# Patient Record
Sex: FEMALE | Race: WHITE | Employment: OTHER | URBAN - METROPOLITAN AREA
[De-identification: names, ages, dates, MRNs, and addresses within clinical notes are randomized per-mention and may not be internally consistent; named-entity substitution may affect disease eponyms.]

---

## 2021-02-02 ENCOUNTER — IMMUNIZATION (OUTPATIENT)
Dept: PRIMARY CARE CLINIC | Age: 81
End: 2021-02-02
Payer: MEDICARE

## 2021-02-02 DIAGNOSIS — Z23 NEED FOR VACCINATION: Primary | ICD-10-CM

## 2021-02-02 PROCEDURE — 91300 COVID-19, PFIZER VACCINE 30MCG/0.3ML DOSE: CPT | Performed by: CLINICAL NURSE SPECIALIST

## 2021-02-02 PROCEDURE — 0001A COVID-19, PFIZER VACCINE 30MCG/0.3ML DOSE: CPT | Performed by: CLINICAL NURSE SPECIALIST

## 2021-02-23 ENCOUNTER — IMMUNIZATION (OUTPATIENT)
Dept: PRIMARY CARE CLINIC | Age: 81
End: 2021-02-23
Payer: MEDICARE

## 2021-02-23 PROCEDURE — 0002A COVID-19, PFIZER VACCINE 30MCG/0.3ML DOSE: CPT | Performed by: CLINICAL NURSE SPECIALIST

## 2021-02-23 PROCEDURE — 91300 COVID-19, PFIZER VACCINE 30MCG/0.3ML DOSE: CPT | Performed by: CLINICAL NURSE SPECIALIST

## 2021-10-12 ENCOUNTER — IMMUNIZATION (OUTPATIENT)
Dept: PRIMARY CARE CLINIC | Age: 81
End: 2021-10-12
Payer: MEDICARE

## 2021-10-12 PROCEDURE — 91300 COVID-19, PFIZER VACCINE 30MCG/0.3ML DOSE: CPT | Performed by: PHYSICIAN ASSISTANT

## 2021-10-12 PROCEDURE — 0003A COVID-19, PFIZER VACCINE 30MCG/0.3ML DOSE: CPT | Performed by: PHYSICIAN ASSISTANT

## 2022-08-06 ENCOUNTER — HOSPITAL ENCOUNTER (EMERGENCY)
Age: 82
Discharge: HOME | End: 2022-08-06
Attending: EMERGENCY MEDICINE
Payer: MEDICARE

## 2022-08-06 VITALS
DIASTOLIC BLOOD PRESSURE: 96 MMHG | TEMPERATURE: 99.2 F | SYSTOLIC BLOOD PRESSURE: 142 MMHG | WEIGHT: 160 LBS | RESPIRATION RATE: 18 BRPM | HEART RATE: 85 BPM | HEIGHT: 65 IN | OXYGEN SATURATION: 96 % | BODY MASS INDEX: 26.66 KG/M2

## 2022-08-06 DIAGNOSIS — L03.211 FACIAL CELLULITIS: Primary | ICD-10-CM

## 2022-08-06 DIAGNOSIS — K04.7 DENTAL INFECTION: ICD-10-CM

## 2022-08-06 LAB — SARS-COV-2, NAAT: NOT DETECTED

## 2022-08-06 PROCEDURE — 6370000000 HC RX 637 (ALT 250 FOR IP): Performed by: STUDENT IN AN ORGANIZED HEALTH CARE EDUCATION/TRAINING PROGRAM

## 2022-08-06 PROCEDURE — 87635 SARS-COV-2 COVID-19 AMP PRB: CPT

## 2022-08-06 PROCEDURE — 99283 EMERGENCY DEPT VISIT LOW MDM: CPT

## 2022-08-06 RX ORDER — OMEPRAZOLE 20 MG/1
20 CAPSULE, DELAYED RELEASE ORAL DAILY
COMMUNITY

## 2022-08-06 RX ORDER — TRAMADOL HYDROCHLORIDE 50 MG/1
50 TABLET ORAL EVERY 8 HOURS PRN
COMMUNITY

## 2022-08-06 RX ORDER — LOSARTAN POTASSIUM 100 MG/1
100 TABLET ORAL DAILY
COMMUNITY

## 2022-08-06 RX ORDER — AMOXICILLIN AND CLAVULANATE POTASSIUM 875; 125 MG/1; MG/1
1 TABLET, FILM COATED ORAL ONCE
Status: COMPLETED | OUTPATIENT
Start: 2022-08-06 | End: 2022-08-06

## 2022-08-06 RX ORDER — VENLAFAXINE HYDROCHLORIDE 37.5 MG/1
37.5 CAPSULE, EXTENDED RELEASE ORAL DAILY
COMMUNITY

## 2022-08-06 RX ORDER — AMLODIPINE BESYLATE 10 MG/1
10 TABLET ORAL DAILY
COMMUNITY

## 2022-08-06 RX ORDER — AMOXICILLIN AND CLAVULANATE POTASSIUM 875; 125 MG/1; MG/1
1 TABLET, FILM COATED ORAL 2 TIMES DAILY
Qty: 20 TABLET | Refills: 0 | Status: SHIPPED | OUTPATIENT
Start: 2022-08-06 | End: 2022-08-16

## 2022-08-06 RX ORDER — VENLAFAXINE 75 MG/1
75 TABLET ORAL NIGHTLY
COMMUNITY

## 2022-08-06 RX ADMIN — AMOXICILLIN AND CLAVULANATE POTASSIUM 1 TABLET: 875; 125 TABLET, FILM COATED ORAL at 22:46

## 2022-08-06 ASSESSMENT — LIFESTYLE VARIABLES
HOW OFTEN DO YOU HAVE A DRINK CONTAINING ALCOHOL: NEVER
HOW OFTEN DO YOU HAVE A DRINK CONTAINING ALCOHOL: NEVER

## 2022-08-06 ASSESSMENT — PAIN DESCRIPTION - LOCATION
LOCATION: MOUTH
LOCATION: MOUTH

## 2022-08-06 ASSESSMENT — PAIN DESCRIPTION - FREQUENCY
FREQUENCY: CONTINUOUS
FREQUENCY: CONTINUOUS

## 2022-08-06 ASSESSMENT — PAIN DESCRIPTION - ORIENTATION
ORIENTATION: LEFT
ORIENTATION: LEFT

## 2022-08-06 ASSESSMENT — PAIN - FUNCTIONAL ASSESSMENT
PAIN_FUNCTIONAL_ASSESSMENT: NONE - DENIES PAIN
PAIN_FUNCTIONAL_ASSESSMENT: 0-10
PAIN_FUNCTIONAL_ASSESSMENT: 0-10

## 2022-08-06 ASSESSMENT — PAIN DESCRIPTION - PAIN TYPE
TYPE: ACUTE PAIN
TYPE: ACUTE PAIN

## 2022-08-06 ASSESSMENT — PAIN SCALES - GENERAL: PAINLEVEL_OUTOF10: 8

## 2022-08-07 NOTE — ED PROVIDER NOTES
[address]   ED Provider Note  Department of Emergency Medicine     ED Room:       Written by: Carlos Moore DO  Patient Name: Lavinia Cooney  Attending Provider: Maulik Price DO  Admit Date: 2022  8:21 PM  MRN: [id number]    : 1940        Chief Complaint   Patient presents with    Fever     Pt has no c/o    Dental Pain     left sided facial redness/ swelling    - Chief complaint    HPI   Lavinia Cooney is a [de-identified] y.o. female presenting to the ED for evaluation of Fever (Pt has no c/o) and Dental Pain (left sided facial redness/ swelling)      History obtained from patient. Patient is presenting for evaluation of left-sided dental pain on the lower aspect as well as redness and some swelling to her left cheek. States she noticed the symptoms starting to develop yesterday, states that she did have a fever earlier today T-max 102 °F. She did not take any antipyretics. Patient's complaints are mild in severity; fever is improved, the facial pain/swelling has been about the same since she noticed it started developing. She does take tramadol at home occasionally for pain, that seem to improve her symptoms. Nothing in particular makes it worse. She denies any headache, neck pain or stiffness, change in vision, difficulty speaking or swallowing, open wounds or drainage noted anywhere. Review of Systems   Constitutional:  Positive for fever. Negative for chills. HENT:  Positive for dental problem (left lower tooth pain) and facial swelling (left lower cheek, with redness and warmth). Negative for rhinorrhea, sore throat, trouble swallowing and voice change. Eyes:  Negative for visual disturbance. Respiratory:  Negative for cough and shortness of breath. Cardiovascular:  Negative for chest pain and palpitations. Gastrointestinal:  Negative for abdominal pain, diarrhea, nausea and vomiting. Genitourinary:  Negative for dysuria and frequency. warm and dry. Capillary Refill: Capillary refill takes less than 2 seconds. Neurological:      General: No focal deficit present. Mental Status: She is alert and oriented to person, place, and time. Sensory: No sensory deficit. Psychiatric:         Mood and Affect: Mood normal.         Behavior: Behavior normal.        Procedures       MDM              Medical Decision Making: This is a [de-identified] y.o. female presenting for evaluation of left facial redness/warmth/swelling/tenderness starting today. Also had a fever. She is alert and oriented on arrival and in no acute distress; she is nontoxic in appearance. No meningeal signs. On examination there is mild redness and warmth to outside left lower/mid cheek region; minimal TTP her; no significant induration or fluctuance. Abnormal dentition noted to left lower and upper teeth; no obvious dental abscess seen on exam. No evidence to suggest Evan's angina. Normal phonation. Handling secretions. Oropharynx clear. No open wounds or drainage noted anywhere. Patient was tested for COVID-19 and it was negative. I do suspect a mild facial cellulitis on the left, possibly related to the abnormal dentition noted here especially given the patient was reporting dental pain. We we will start her on a course of Augmentin and directed her to follow-up outpatient with both her PCP and the [address] [facility] in [facility]. I made her an appointment online and provided her with the information on how to contact their office first thing Monday morning. Patient is amenable to this plan. She declined any need for analgesic medication during this encounter. She remained afebrile during this encounter. Feel she is stable and appropriate for discharge. Results and plan were discussed with the patient and her , they voiced understanding and are amenable. Strict return precautions were discussed. See ED COURSE for additional MDM. Labs & imaging were reviewed and interpreted, see RESULTS. I have personally reviewed all laboratory and imaging results for this patient. I have discussed this patient with my attending, who has seen the patient and agrees with this disposition. Patient was seen and evaluated by myself and my attending Husam Navarrete DO. Assessment and Plan discussed with attending provider, please see attestation for final plan of care.         --------------------------------------------- PAST HISTORY ---------------------------------------------  Past Medical History:  has no past medical history on file. Past Surgical History:  has no past surgical history on file. Social History:  reports that she has never smoked. She has never been exposed to tobacco smoke. She has never used smokeless tobacco. She reports that she does not drink alcohol and does not use drugs. Family History: family history is not on file. Unless otherwise noted, family history is non contributory. The patients home medications have been reviewed. Allergies: Patient has no known allergies. -------------------------------------------------- RESULTS -------------------------------------------------  Labs:  Results for orders placed or performed during the hospital encounter of 08/06/22   COVID-19, Rapid    Specimen: Nasopharyngeal Swab   Result Value Ref Range    SARS-CoV-2, NAAT Not Detected Not Detected       Radiology:  No orders to display       Interpreted by the radiologist unless otherwise specified.      ------------------------- NURSING NOTES AND VITALS REVIEWED ---------------------------  Date / Time Roomed:  8/6/2022  8:21 PM  ED Bed Assignment:  05/05    The nursing notes within the ED encounter and vital signs as below have been reviewed by myself.    BP (!) 142/96   Pulse 85   Temp 99.2 °F (37.3 °C)   Resp 18   Ht 5' 5\" (1.651 m)   Wt 160 lb (72.6 kg)   SpO2 96%   BMI 26.63 kg/m²   Oxygen Saturation Lior Todd DO  Resident  08/08/22 7858      ATTENDING PROVIDER ATTESTATION:     I have personally performed and/or participated in the history, exam, medical decision making, and procedures and agree with all pertinent clinical information. I have also reviewed and agree with the past medical, family and social history unless otherwise noted. I have discussed this patient in detail with the resident, and provided the instruction and education regarding fever, dental pain, facial swelling and abscess. My findings/Plan: Tachycardic. Lungs CTA bilaterally. Abdomen soft, nontender. Bowel sounds normal. Dental caries present without evidence of abscess. Left facial swelling noted. Supportive care. Antibiotics. Discharge for outpatient follow up with dentistry.        Antione Khanna DO  09/08/22 2201

## 2022-08-08 ASSESSMENT — ENCOUNTER SYMPTOMS
RHINORRHEA: 0
ABDOMINAL PAIN: 0
VOMITING: 0
COUGH: 0
NAUSEA: 0
SHORTNESS OF BREATH: 0
SORE THROAT: 0
DIARRHEA: 0
VOICE CHANGE: 0
TROUBLE SWALLOWING: 0
FACIAL SWELLING: 1
BACK PAIN: 0

## 2022-12-13 ENCOUNTER — OFFICE VISIT (OUTPATIENT)
Dept: FAMILY MEDICINE CLINIC | Age: 82
End: 2022-12-13
Payer: MEDICARE

## 2022-12-13 VITALS
HEART RATE: 72 BPM | TEMPERATURE: 96.8 F | HEIGHT: 65 IN | RESPIRATION RATE: 17 BRPM | WEIGHT: 160 LBS | DIASTOLIC BLOOD PRESSURE: 85 MMHG | BODY MASS INDEX: 26.66 KG/M2 | OXYGEN SATURATION: 98 % | SYSTOLIC BLOOD PRESSURE: 135 MMHG

## 2022-12-13 DIAGNOSIS — R05.1 ACUTE COUGH: Primary | ICD-10-CM

## 2022-12-13 PROCEDURE — 99213 OFFICE O/P EST LOW 20 MIN: CPT

## 2022-12-13 PROCEDURE — 1123F ACP DISCUSS/DSCN MKR DOCD: CPT

## 2022-12-13 RX ORDER — FLUTICASONE PROPIONATE 50 MCG
SPRAY, SUSPENSION (ML) NASAL
Qty: 16 G | Refills: 0 | Status: SHIPPED | OUTPATIENT
Start: 2022-12-13

## 2022-12-13 RX ORDER — DEXTROMETHORPHN/ACETAMINOPH/CP 10-325-2MG
2 TABLET ORAL EVERY 4 HOURS PRN
Qty: 24 TABLET | Refills: 0 | Status: SHIPPED | OUTPATIENT
Start: 2022-12-13

## 2022-12-13 SDOH — ECONOMIC STABILITY: FOOD INSECURITY: WITHIN THE PAST 12 MONTHS, YOU WORRIED THAT YOUR FOOD WOULD RUN OUT BEFORE YOU GOT MONEY TO BUY MORE.: NEVER TRUE

## 2022-12-13 SDOH — ECONOMIC STABILITY: FOOD INSECURITY: WITHIN THE PAST 12 MONTHS, THE FOOD YOU BOUGHT JUST DIDN'T LAST AND YOU DIDN'T HAVE MONEY TO GET MORE.: NEVER TRUE

## 2022-12-13 ASSESSMENT — PATIENT HEALTH QUESTIONNAIRE - PHQ9
SUM OF ALL RESPONSES TO PHQ9 QUESTIONS 1 & 2: 0
SUM OF ALL RESPONSES TO PHQ QUESTIONS 1-9: 0
2. FEELING DOWN, DEPRESSED OR HOPELESS: 0
1. LITTLE INTEREST OR PLEASURE IN DOING THINGS: 0
SUM OF ALL RESPONSES TO PHQ QUESTIONS 1-9: 0

## 2022-12-13 ASSESSMENT — SOCIAL DETERMINANTS OF HEALTH (SDOH): HOW HARD IS IT FOR YOU TO PAY FOR THE VERY BASICS LIKE FOOD, HOUSING, MEDICAL CARE, AND HEATING?: NOT HARD AT ALL

## 2022-12-13 NOTE — PROGRESS NOTES
22  Tee Martinez : 1940 Sex: female  Age [de-identified] y.o. Subjective:  Chief Complaint   Patient presents with    Cough      is positive for covid        HPI:   Tee Martinez , [de-identified] y.o. female presents to the clinic for evaluation of cough x 7 days. The patient also reports congestion. The patient has not taken OTC medication for symptoms. The patient reports no change symptoms over time. The patient has had ill exposure,  tested positive for Covid. The patient denies hx of COVID-19. The patient denies acute loss of taste and smell, headache, sinus congestion, sore throat, rash, and fever. The patient also denies chest pain, abdominal pain, shortness of breath, wheezing, and nausea / vomiting / diarrhea. ROS:   Unless otherwise stated in this report the patient's positive and negative responses for review of systems for constitutional, eyes, ENT, cardiovascular, respiratory, gastrointestinal, neurological, , musculoskeletal, and integument systems and related systems to the presenting problem are either stated in the history of present illness or were not pertinent or were negative for the symptoms and/or complaints related to the presenting medical problem. Positives and pertinent negatives as per HPI. All others reviewed and are negative. PMH:   No past medical history on file. No past surgical history on file. No family history on file. Medications:     Current Outpatient Medications:     DM-APAP-CPM (CORICIDIN HBP) -2 MG TABS, Take 2 tablets by mouth every 4 hours as needed (Congestion), Disp: 24 tablet, Rfl: 0    fluticasone (FLONASE) 50 MCG/ACT nasal spray, 1-2 sprays, each nostril daily as needed for nasal congestion. , Disp: 16 g, Rfl: 0    amLODIPine (NORVASC) 10 MG tablet, Take 10 mg by mouth in the morning., Disp: , Rfl:     omeprazole (PRILOSEC) 20 MG delayed release capsule, Take 20 mg by mouth in the morning., Disp: , Rfl:     losartan (COZAAR) 100 MG tablet, Take 100 mg by mouth in the morning., Disp: , Rfl:     traMADol (ULTRAM) 50 MG tablet, Take 50 mg by mouth every 8 hours as needed for Pain., Disp: , Rfl:     venlafaxine (EFFEXOR XR) 37.5 MG extended release capsule, Take 37.5 mg by mouth in the morning., Disp: , Rfl:     venlafaxine (EFFEXOR) 75 MG tablet, Take 75 mg by mouth nightly, Disp: , Rfl:     Allergies:   No Known Allergies    Social History:     Social History     Tobacco Use    Smoking status: Never     Passive exposure: Never    Smokeless tobacco: Never   Vaping Use    Vaping Use: Never used   Substance Use Topics    Alcohol use: Never    Drug use: Never       Physical Exam:     Vitals:    12/13/22 0837   BP: 135/85   Pulse: 72   Resp: 17   Temp: 96.8 °F (36 °C)   TempSrc: Temporal   SpO2: 98%   Weight: 160 lb (72.6 kg)   Height: 5' 5\" (1.651 m)       Physical Exam (PE)    Physical Exam  Vitals and nursing note reviewed. Constitutional:       Appearance: She is well-developed. HENT:      Head: Normocephalic and atraumatic. Right Ear: Hearing and external ear normal.      Left Ear: Hearing and external ear normal.      Nose: Congestion present. Mouth/Throat:      Pharynx: Uvula midline. Posterior oropharyngeal erythema present. Comments: Post nasal drip  Eyes:      General: Lids are normal.      Conjunctiva/sclera: Conjunctivae normal.      Pupils: Pupils are equal, round, and reactive to light. Cardiovascular:      Rate and Rhythm: Normal rate and regular rhythm. Heart sounds: Normal heart sounds. No murmur heard. Pulmonary:      Effort: Pulmonary effort is normal.      Breath sounds: Normal breath sounds. Abdominal:      General: Bowel sounds are normal.      Palpations: Abdomen is soft. Abdomen is not rigid. Tenderness: There is no abdominal tenderness. There is no guarding. Musculoskeletal:      Cervical back: Normal range of motion and neck supple. Skin:     General: Skin is warm and dry. Findings: No abrasion or rash. Neurological:      Mental Status: She is alert and oriented to person, place, and time. GCS: GCS eye subscore is 4. GCS verbal subscore is 5. GCS motor subscore is 6. Coordination: Coordination normal.      Gait: Gait normal.        Testing:   (All laboratory and radiology results have been personally reviewed by myself)  Labs:  No results found for this visit on 12/13/22. Imaging: All Radiology results interpreted by Radiologist unless otherwise noted. No orders to display       Assessment / Plan:   The patient's vitals, allergies, medications, and past medical history have been reviewed. Alicja Luis was seen today for cough. Diagnoses and all orders for this visit:    Acute cough    Other orders  -     DM-APAP-CPM (CORICIDIN HBP) -2 MG TABS; Take 2 tablets by mouth every 4 hours as needed (Congestion)  -     fluticasone (FLONASE) 50 MCG/ACT nasal spray; 1-2 sprays, each nostril daily as needed for nasal congestion.      - Disposition: home    - Educational material printed for patient's review and were included in patient instructions. After Visit Summary was given to patient at the end of visit. - COVID-19 swab obtained and Negative, will call with results once available. Encouraged oral fluids and rest. Discussed symptomatic treatments with patient today. The patient is to schedule a follow-up with PCP in the next 2-3 days for reevaluation. Red flag symptoms were also discussed with the patient today. If symptoms worsen the patient is to go directly to the emergency department for reevaluation and treatment. Pt verbalizes understanding and is in agreement with plan of care. All questions answered. SIGNATURE: ARUN Syed - CNP      *NOTE: This report was transcribed using voice recognition software. Every effort was made to ensure accuracy; however, inadvertent computerized transcription errors may be present.

## 2023-01-29 ENCOUNTER — APPOINTMENT (OUTPATIENT)
Dept: GENERAL RADIOLOGY | Age: 83
End: 2023-01-29
Payer: MEDICARE

## 2023-01-29 ENCOUNTER — HOSPITAL ENCOUNTER (EMERGENCY)
Age: 83
Discharge: HOME | End: 2023-01-29
Payer: MEDICARE

## 2023-01-29 VITALS
SYSTOLIC BLOOD PRESSURE: 148 MMHG | OXYGEN SATURATION: 96 % | TEMPERATURE: 97.9 F | RESPIRATION RATE: 18 BRPM | HEART RATE: 90 BPM | DIASTOLIC BLOOD PRESSURE: 82 MMHG

## 2023-01-29 DIAGNOSIS — S80.12XA HEMATOMA OF LEFT LOWER LEG: Primary | ICD-10-CM

## 2023-01-29 PROCEDURE — 99283 EMERGENCY DEPT VISIT LOW MDM: CPT

## 2023-01-29 PROCEDURE — 6370000000 HC RX 637 (ALT 250 FOR IP): Performed by: PHYSICIAN ASSISTANT

## 2023-01-29 PROCEDURE — 73590 X-RAY EXAM OF LOWER LEG: CPT

## 2023-01-29 RX ORDER — ACETAMINOPHEN 500 MG
1000 TABLET ORAL ONCE
Status: COMPLETED | OUTPATIENT
Start: 2023-01-29 | End: 2023-01-29

## 2023-01-29 RX ORDER — ACETAMINOPHEN 500 MG
1000 TABLET ORAL EVERY 8 HOURS PRN
Qty: 30 TABLET | Refills: 0 | Status: SHIPPED | OUTPATIENT
Start: 2023-01-29 | End: 2023-02-03

## 2023-01-29 RX ADMIN — ACETAMINOPHEN 1000 MG: 500 TABLET ORAL at 12:19

## 2023-01-29 ASSESSMENT — PAIN SCALES - GENERAL
PAINLEVEL_OUTOF10: 2
PAINLEVEL_OUTOF10: 4
PAINLEVEL_OUTOF10: 4

## 2023-01-29 ASSESSMENT — PAIN - FUNCTIONAL ASSESSMENT: PAIN_FUNCTIONAL_ASSESSMENT: 0-10

## 2023-01-29 NOTE — ED PROVIDER NOTES
Independent [facility] Visit. [address] [facility]  Department of Emergency Medicine   ED  Encounter Note  Admit Date/RoomTime: 2023 11:26 AM  ED Room:     NAME: Paula Morales  : 1940  MRN: [id number]     Chief Complaint:  Leg Pain and Fall (Tripped and fell over shoes in the cellar. Complains of pain to left knee and leg)    History of Present Illness       Paula Morales is a [de-identified] y.o. old female who presents to the emergency department by private vehicle, for traumatic Left shin pain which occured 1 hour(s) prior to arrival.   The complaint is due to patient stepping down to let her cat into her home and striking her shin on possibly the wall or step. Her weight bearing status is difficult secondary to discomfort. Patient has no prior history of pain/injury with regards to today's visit. Since onset the symptoms have been gradually worsening. Her pain is aggraveated by pressure on or palpation of painful area and relieved by rest of injured area. She denies any head injury, headache, loss of consciousness, confusion, dizziness, neck pain, chest pain, abdominal pain, back pain, numbness, weakness, blurred vision, nausea, vomiting, fever, or chills. The patients tetanus status is unknown. ROS   Pertinent positives and negatives are stated within HPI, all other systems reviewed and are negative. Past Medical History:  has no past medical history on file. Surgical History:  has no past surgical history on file. Social History:  reports that she has never smoked. She has never been exposed to tobacco smoke. She has never used smokeless tobacco. She reports that she does not drink alcohol and does not use drugs. Family History: family history is not on file. Allergies: Patient has no known allergies.     Physical Exam   Oxygen Saturation Interpretation: Normal.        ED Triage Vitals   BP Temp Temp src Heart Rate Resp SpO2 Height Weight   23 1106 23 1047 -- 01/29/23 1106 01/29/23 1051 -- -- --   (!) 102/59 97.9 °F (36.6 °C)  (!) 120 20            Constitutional:  Alert, development consistent with age.  HEENT:  NC/NT.  Airway patent.  Neck:  Normal ROM.  Supple.  Physical Exam  Left Lower Extremity(s): proximal/mid tib fib              Tenderness:  mild.               Swelling: Moderate.        Calf:  No evidence of DVT seen on physical exam..             Deformity: no deformity observed/palpated.               ROM: full range with pain.               Skin: Large hematoma present with overlying ecchymosis and edema without active bleeding active drainage foreign body or bony deformity.  No signs of infection. See image documentation below.   Neurovascular:               Motor deficit: none.               Sensory deficit: none.                Pulse deficit: none.  2+ dorsalis pedis and posterior tibial pulses intact             Capillary refill: normal.  Limb is warm and well perfused  Gait:  limp due to affected limb.  Lymphatics: No lymphangitis or adenopathy noted.  Neurological:  Oriented x3.  Motor functions intact.      Lab / Imaging Results   (All laboratory and radiology results have been personally reviewed by myself)  Labs:  No results found for this visit on 01/29/23.  Imaging:  All Radiology results interpreted by Radiologist unless otherwise noted.  XR TIBIA FIBULA LEFT (2 VIEWS)   Final Result   Asymmetric enlargement of the medial soft tissues adjacent to the proximal   mid diaphysis tibia could represent hematoma with adjacent edema without   radiopaque foreign body in the soft tissues.  No acute osseous findings           ED Course / Medical Decision Making     Medications   acetaminophen (TYLENOL) tablet 1,000 mg (1,000 mg Oral Given 1/29/23 1219)        Consults:   None    Procedure(s):  None    Medical Decision Making    Patient presents to the ER for injury to left shin.   Patient acts as her own historian.  Social Determinants include   Social  Connections: Not on file    Social Determinants : None. Chronic conditions  History reviewed. No pertinent past medical history. .    Physical exam large hematoma and edema noted to left proximal/mid shin without bony deformity, as noted above. Vital signs within normal limits prior to discharge. Differential diagnoses include but not limited to hematoma, contusion, tibial fracture, fibular fracture, DVT. Diagnostic studies revealed asymmetric enlargement of the medial soft tissues adjacent to the proximal mid diaphysis tibia could represent hematoma with adjacent edema without radiopaque foreign body in the soft tissue was noted by radiology. . Consults included none. Results were discussed with patient prior to disposition all questions were answered. Patient was given Tylenol 1000 mg by mouth for their symptoms with mild improvement. Patient will be discharged home with the following prescriptions, Tylenol 500 mg tablet take 2 tablets by mouth every 8 hours as needed for pain. Duplex ultrasound of the left lower extremity was considered but was not performed as patient has no findings that are suggestive of DVT. Good pulses intact without calf tenderness. Discussed appropriate use and potential side effects of starting the prescribed medications. Patient continues to be non-toxic on re-evaluation. Patient was placed in an Ace wrap for compression support via nursing staff. She was recommended to utilize this while up and active for the next 1 to 2 weeks as well as RICE. Patient is appropriate for discharge home she is alert and oriented, no acute distress, afebrile nontachycardic and nonhypoxic. Findings were discussed with the patient and reasons to immediately return to the ED were articulated to them. They will follow-up with their PMD within 3 days for ED after visit and return to ER with new or worsening symptoms. Patient understandable and agreeable to plan.       Discharge Instructions:   Patient referred to  Deondre Mendez MD  [address] [facility] [id number]    Schedule an appointment as soon as possible for a visit in 2 days      MEDICATIONS:   DISCHARGE MEDICATIONS:  New Prescriptions    ACETAMINOPHEN (TYLENOL) 500 MG TABLET    Take 2 tablets by mouth every 8 hours as needed for Pain       DISCONTINUED MEDICATIONS:  Discontinued Medications    No medications on file       Record Review:  Records Reviewed : None       Disposition Considerations: This patient's ED course included: a personal history and physicial examination and re-evaluation prior to disposition  This patient has improved and been closely monitored during their ED course. I emphasized the importance of follow-up with the physician I referred them to in the timeframe recommended. I discussed with the patient emergent symptoms and the need to immediately return to the ER. Written information was included in their discharge instructions. Additional verbal discharge instructions were also given and discussed with the patient to supplement those generated by the EMR. We also discussed medications that were prescribed  (if any) including common side effects and interactions. The patient was advised to abstain from driving, operating heavy machinery or making significant decisions while taking medications such as opiates and muscle relaxers that may impair this. All questions were addressed. They understand return precautions and discharge instructions. The patient  expressed understanding. Vitals were stable and they were in no distress at discharge. Plan of Care/Counseling:  MICHELLE Gunn reviewed today's visit with the patient in addition to providing specific details for the plan of care and counseling regarding the diagnosis and prognosis. Questions are answered at this time and are agreeable with the plan. Assessment      1. Hematoma of left lower leg      Plan   Discharged home.   Patient condition is good    New Medications     New Prescriptions    ACETAMINOPHEN (TYLENOL) 500 MG TABLET    Take 2 tablets by mouth every 8 hours as needed for Pain     Electronically signed by MICHELLE Gan   DD: 1/29/23  **This report was transcribed using voice recognition software. Every effort was made to ensure accuracy; however, inadvertent computerized transcription errors may be present.   END OF ED PROVIDER NOTE      Tahir Gan  01/29/23 [id number]

## 2025-07-01 ENCOUNTER — ANESTHESIA EVENT (OUTPATIENT)
Dept: OPERATING ROOM | Age: 85
End: 2025-07-01
Payer: MEDICARE

## 2025-07-01 RX ORDER — ROSUVASTATIN CALCIUM 10 MG/1
10 TABLET, COATED ORAL DAILY
COMMUNITY

## 2025-07-01 RX ORDER — METOPROLOL SUCCINATE 25 MG/1
25 TABLET, EXTENDED RELEASE ORAL DAILY
COMMUNITY

## 2025-07-01 RX ORDER — DONEPEZIL HYDROCHLORIDE 5 MG/1
5 TABLET, FILM COATED ORAL NIGHTLY
COMMUNITY

## 2025-07-01 RX ORDER — ESCITALOPRAM OXALATE 20 MG/1
20 TABLET ORAL DAILY
COMMUNITY

## 2025-07-01 RX ORDER — MULTIVITAMIN WITH IRON
1 TABLET ORAL DAILY
COMMUNITY

## 2025-07-01 NOTE — PROGRESS NOTES
[facility]   PRE-ADMISSION TESTING GENERAL INSTRUCTIONS  PAT Phone Number: [phone]      GENERAL INSTRUCTIONS:    [x] Antibacterial Soap Shower Night before AND the Morning of procedure.  [x] Do not wear makeup, lotions, powders, deodorant the morning of surgery.  [x] No solid food after midnight. You may have SIPS of clear liquids up until 2 hours before your arrival time to the hospital.   [x] You may brush your teeth, gargle, but do not swallow water.   [x] No tobacco products, illegal drugs, or alcohol within 24 hours of your surgery.  [x] Jewelry or valuables should not be brought to the hospital. All body and/or tongue piercing's must be removed prior to arriving to hospital. No contact lens or hair pins.   [x] Arrange transportation with a responsible adult  to and from the hospital. Arrange for someone to be with you for the remainder of the day and for 24 hours after your procedure due to having had anesthesia.          -Who will be your  for transportation Family        -Who will be staying with you for 24 hrs after your procedure? Family   [x] Bring insurance card and photo ID.  [x] Bring copy of living will or healthcare power of  papers to be placed in your electronic record.       PARKING INSTRUCTIONS:     [x] ARRIVAL DATE & TIME: 7/2 @1000  [x] Times are subject to change. We will contact you the business day before surgery if that were to occur.  [x] Enter into the [facility] Entrance. Two people may accompany you. Masks are not required.  [x] Parking Lot \"I\" is where you will park. It is located on the corner of [address] and [address]. The entrance is on [address].   Only one vehicle - per patient, is permitted in parking lot.   Upon entering the parking lot, a voucher ticket will print.    MEDICATION INSTRUCTIONS:    [x] Bring a complete list of your medications, please write the last time you took the medicine, give this

## 2025-07-01 NOTE — H&P
[facility]              [address] [place] [address]                           HISTORY & PHYSICAL      PATIENT NAME: JENNIFER LARKIN              : 1940  MED REC NO: [id number]                        ROOM:   ACCOUNT NO: [id number]                       ADMIT DATE: 2025  PROVIDER: Davy Gaytan MD      Date of surgery:  2025    CHIEF COMPLAINT:  Right toe ulceration.    HISTORY OF PRESENT ILLNESS:  The patient is an elderly female who presented initially to Dr. Ojeda.  She has a longstanding problem with an ulceration along the right 3rd toe.  She has been treating it conservatively and ultimately Dr. Ojeda did a biopsy which demonstrated basal cell carcinoma.  She was referred to me for excision and reconstruction.  Recommendation was made for wide excision of this tumor with either a local flap or a full-thickness skin graft reconstruction.    PAST MEDICAL HISTORY:  Vitamin D deficiency, hyperlipidemia, anxiety disorder, tinnitus, hypertensive heart disease, heart failure, right bundle branch block, GE reflux without esophagitis, Gomez's esophagus without dysplasia, polyosteoarthritis, chronic kidney disease, vertigo, dementia, mood disturbance.    MEDICATIONS:  Lexapro, losartan, metoprolol, omeprazole, rosuvastatin, Tylenol, vitamin B12.    SOCIAL HISTORY:  Patient is a nonsmoker, nondrinker.    PHYSICAL EXAMINATION:  HEAD:  Normocephalic, atraumatic.  EYES:  Extraocular movement intact.  Pupils equal, round, reactive to light and accommodation.  Sclerae are clear.  LUNGS:  Clear to auscultation.  CARDIOVASCULAR:  Regular rate and rhythm.  ABDOMEN:  Soft, nontender.  EXTREMITIES:  Within normal limits except the right lower extremity, which demonstrates an ulceration of approximately 2 to 3 cm involving the right 3rd toe, biopsy-proven basal cell carcinoma.   NEUROLOGIC:  No obvious deficit.    IMPRESSION:  Basal cell carcinoma,

## 2025-07-02 ENCOUNTER — ANESTHESIA (OUTPATIENT)
Dept: OPERATING ROOM | Age: 85
End: 2025-07-02
Payer: MEDICARE

## 2025-07-02 ENCOUNTER — HOSPITAL ENCOUNTER (OUTPATIENT)
Age: 85
Discharge: HOME | End: 2025-07-02
Attending: PLASTIC SURGERY | Admitting: PLASTIC SURGERY
Payer: MEDICARE

## 2025-07-02 VITALS
TEMPERATURE: 98 F | WEIGHT: 164.2 LBS | HEART RATE: 57 BPM | OXYGEN SATURATION: 97 % | BODY MASS INDEX: 29.09 KG/M2 | RESPIRATION RATE: 16 BRPM | DIASTOLIC BLOOD PRESSURE: 70 MMHG | HEIGHT: 63 IN | SYSTOLIC BLOOD PRESSURE: 169 MMHG

## 2025-07-02 DIAGNOSIS — C43.71 MELANOMA OF SKIN OF LOWER LIMB, RIGHT (HCC): ICD-10-CM

## 2025-07-02 DIAGNOSIS — G89.18 POST-OPERATIVE PAIN: Primary | ICD-10-CM

## 2025-07-02 PROBLEM — C44.712: Status: ACTIVE | Noted: 2025-07-02

## 2025-07-02 PROCEDURE — 3600000013 HC SURGERY LEVEL 3 ADDTL 15MIN: Performed by: PLASTIC SURGERY

## 2025-07-02 PROCEDURE — 88305 TISSUE EXAM BY PATHOLOGIST: CPT

## 2025-07-02 PROCEDURE — 2500000003 HC RX 250 WO HCPCS: Performed by: PLASTIC SURGERY

## 2025-07-02 PROCEDURE — 3700000000 HC ANESTHESIA ATTENDED CARE: Performed by: PLASTIC SURGERY

## 2025-07-02 PROCEDURE — 6370000000 HC RX 637 (ALT 250 FOR IP): Performed by: PLASTIC SURGERY

## 2025-07-02 PROCEDURE — 2580000003 HC RX 258: Performed by: NURSE ANESTHETIST, CERTIFIED REGISTERED

## 2025-07-02 PROCEDURE — 7100000011 HC PHASE II RECOVERY - ADDTL 15 MIN: Performed by: PLASTIC SURGERY

## 2025-07-02 PROCEDURE — 3600000003 HC SURGERY LEVEL 3 BASE: Performed by: PLASTIC SURGERY

## 2025-07-02 PROCEDURE — 7100000010 HC PHASE II RECOVERY - FIRST 15 MIN: Performed by: PLASTIC SURGERY

## 2025-07-02 PROCEDURE — 6360000002 HC RX W HCPCS: Performed by: PLASTIC SURGERY

## 2025-07-02 PROCEDURE — 6360000002 HC RX W HCPCS: Performed by: NURSE ANESTHETIST, CERTIFIED REGISTERED

## 2025-07-02 PROCEDURE — 3700000001 HC ADD 15 MINUTES (ANESTHESIA): Performed by: PLASTIC SURGERY

## 2025-07-02 PROCEDURE — 2709999900 HC NON-CHARGEABLE SUPPLY: Performed by: PLASTIC SURGERY

## 2025-07-02 RX ORDER — SODIUM CHLORIDE 9 MG/ML
INJECTION, SOLUTION INTRAVENOUS PRN
Status: DISCONTINUED | OUTPATIENT
Start: 2025-07-02 | End: 2025-07-02 | Stop reason: HOSPADM

## 2025-07-02 RX ORDER — BUPIVACAINE HYDROCHLORIDE 2.5 MG/ML
INJECTION, SOLUTION EPIDURAL; INFILTRATION; INTRACAUDAL; PERINEURAL PRN
Status: DISCONTINUED | OUTPATIENT
Start: 2025-07-02 | End: 2025-07-02 | Stop reason: ALTCHOICE

## 2025-07-02 RX ORDER — HYDROMORPHONE HYDROCHLORIDE 1 MG/ML
0.5 INJECTION, SOLUTION INTRAMUSCULAR; INTRAVENOUS; SUBCUTANEOUS EVERY 5 MIN PRN
Status: DISCONTINUED | OUTPATIENT
Start: 2025-07-02 | End: 2025-07-02 | Stop reason: HOSPADM

## 2025-07-02 RX ORDER — FENTANYL CITRATE 50 UG/ML
INJECTION, SOLUTION INTRAMUSCULAR; INTRAVENOUS
Status: DISCONTINUED | OUTPATIENT
Start: 2025-07-02 | End: 2025-07-02 | Stop reason: SDUPTHER

## 2025-07-02 RX ORDER — MUPIROCIN 2 %
OINTMENT (GRAM) TOPICAL PRN
Status: DISCONTINUED | OUTPATIENT
Start: 2025-07-02 | End: 2025-07-02 | Stop reason: ALTCHOICE

## 2025-07-02 RX ORDER — NALOXONE HYDROCHLORIDE 0.4 MG/ML
INJECTION, SOLUTION INTRAMUSCULAR; INTRAVENOUS; SUBCUTANEOUS PRN
Status: DISCONTINUED | OUTPATIENT
Start: 2025-07-02 | End: 2025-07-02 | Stop reason: HOSPADM

## 2025-07-02 RX ORDER — SODIUM CHLORIDE 9 MG/ML
INJECTION, SOLUTION INTRAVENOUS
Status: DISCONTINUED | OUTPATIENT
Start: 2025-07-02 | End: 2025-07-02 | Stop reason: SDUPTHER

## 2025-07-02 RX ORDER — HYDROMORPHONE HYDROCHLORIDE 1 MG/ML
0.25 INJECTION, SOLUTION INTRAMUSCULAR; INTRAVENOUS; SUBCUTANEOUS EVERY 5 MIN PRN
Status: DISCONTINUED | OUTPATIENT
Start: 2025-07-02 | End: 2025-07-02 | Stop reason: HOSPADM

## 2025-07-02 RX ORDER — SODIUM CHLORIDE 0.9 % (FLUSH) 0.9 %
5-40 SYRINGE (ML) INJECTION EVERY 12 HOURS SCHEDULED
Status: DISCONTINUED | OUTPATIENT
Start: 2025-07-02 | End: 2025-07-02 | Stop reason: HOSPADM

## 2025-07-02 RX ORDER — SODIUM CHLORIDE 9 MG/ML
INJECTION, SOLUTION INTRAVENOUS CONTINUOUS
Status: DISCONTINUED | OUTPATIENT
Start: 2025-07-02 | End: 2025-07-02 | Stop reason: HOSPADM

## 2025-07-02 RX ORDER — SODIUM CHLORIDE 0.9 % (FLUSH) 0.9 %
5-40 SYRINGE (ML) INJECTION PRN
Status: DISCONTINUED | OUTPATIENT
Start: 2025-07-02 | End: 2025-07-02 | Stop reason: HOSPADM

## 2025-07-02 RX ORDER — PHENYLEPHRINE HCL IN 0.9% NACL 1 MG/10 ML
SYRINGE (ML) INTRAVENOUS
Status: DISCONTINUED | OUTPATIENT
Start: 2025-07-02 | End: 2025-07-02 | Stop reason: SDUPTHER

## 2025-07-02 RX ORDER — ONDANSETRON 2 MG/ML
4 INJECTION INTRAMUSCULAR; INTRAVENOUS
Status: DISCONTINUED | OUTPATIENT
Start: 2025-07-02 | End: 2025-07-02 | Stop reason: HOSPADM

## 2025-07-02 RX ORDER — PROPOFOL 10 MG/ML
INJECTION, EMULSION INTRAVENOUS
Status: DISCONTINUED | OUTPATIENT
Start: 2025-07-02 | End: 2025-07-02 | Stop reason: SDUPTHER

## 2025-07-02 RX ORDER — LIDOCAINE HYDROCHLORIDE AND EPINEPHRINE 10; 10 MG/ML; UG/ML
INJECTION, SOLUTION INFILTRATION; PERINEURAL PRN
Status: DISCONTINUED | OUTPATIENT
Start: 2025-07-02 | End: 2025-07-02 | Stop reason: ALTCHOICE

## 2025-07-02 RX ADMIN — FENTANYL CITRATE 50 MCG: 50 INJECTION, SOLUTION INTRAMUSCULAR; INTRAVENOUS at 12:32

## 2025-07-02 RX ADMIN — CEFAZOLIN 2000 MG: 1 INJECTION, POWDER, FOR SOLUTION INTRAMUSCULAR; INTRAVENOUS at 12:21

## 2025-07-02 RX ADMIN — FENTANYL CITRATE 25 MCG: 50 INJECTION, SOLUTION INTRAMUSCULAR; INTRAVENOUS at 12:20

## 2025-07-02 RX ADMIN — PROPOFOL 50 MCG/KG/MIN: 10 INJECTION, EMULSION INTRAVENOUS at 12:20

## 2025-07-02 RX ADMIN — FENTANYL CITRATE 25 MCG: 50 INJECTION, SOLUTION INTRAMUSCULAR; INTRAVENOUS at 12:22

## 2025-07-02 RX ADMIN — SODIUM CHLORIDE: 9 INJECTION, SOLUTION INTRAVENOUS at 11:42

## 2025-07-02 RX ADMIN — Medication 100 MCG: at 12:43

## 2025-07-02 RX ADMIN — Medication 50 MCG: at 13:09

## 2025-07-02 ASSESSMENT — PAIN SCALES - GENERAL
PAINLEVEL_OUTOF10: 0
PAINLEVEL_OUTOF10: 0

## 2025-07-02 NOTE — BRIEF OP NOTE
Brief Postoperative Note      Patient: Nevin Pena  YOB: 1940  MRN: [id number]    Date of Procedure: 7/2/2025    Pre-Op Diagnosis Codes:      * Melanoma of skin of lower limb, right (HCC) [C43.71]    Post-Op Diagnosis: Same       Procedure(s):  EXCISION BASAL CELL CARCINOMA RIGHT 3RD TOE    Surgeon(s):  Davy Gaytan MD    Assistant:  Resident: Yvan Lopez DPM    Anesthesia: Monitor Anesthesia Care    Estimated Blood Loss (mL): Minimal    Complications: None    Specimens:   ID Type Source Tests Collected by Time Destination   A : 12 O'CLOCK PROXIMAL RIGHT 3RD TOE Tissue Tissue SURGICAL PATHOLOGY Davy Gaytan MD 7/2/2025 1243    B : 6-12 MARGIN, STITCH MARKS NEW MARGIN 12 O'CLOCK RIGHT 3RD TOE Specimen Toe SURGICAL PATHOLOGY Davy Gaytan MD 7/2/2025 1244        Implants:  * No implants in log *      Drains: * No LDAs found *    Findings:  Infection Present At Time Of Surgery (PATOS) (choose all levels that have infection present):  No infection present  Other Findings: Basal Cell Carcinoma Full Thickness  This procedure was not performed to treat primary cutaneous melanoma through wide local excision    Electronically signed by Yvan Lopez DPM on 7/2/2025 at 2:02 PM

## 2025-07-02 NOTE — ANESTHESIA POSTPROCEDURE EVALUATION
Department of Anesthesiology  Postprocedure Note    Patient: Nevin Pena  MRN: [id number]  YOB: 1940  Date of evaluation: 7/2/2025    Procedure Summary       Date: 07/02/25 Room / Location: [address]    Anesthesia Start: 1212 Anesthesia Stop: 1354    Procedure: EXCISION BASAL CELL CARCINOMA RIGHT 3RD TOE (Right: Third Toe) Diagnosis:       Melanoma of skin of lower limb, right (HCC)      (Melanoma of skin of lower limb, right (HCC) [C43.71])    Surgeons: Davy Gaytan MD Responsible Provider: Luis Haq DO    Anesthesia Type: MAC ASA Status: 3            Anesthesia Type: No value filed.    Lovely Phase I: Lovely Score: 10    Lovely Phase II: Lovely Score: 10    Anesthesia Post Evaluation    Patient location during evaluation: PACU  Patient participation: complete - patient participated  Level of consciousness: awake and alert  Pain score: 1  Airway patency: patent  Nausea & Vomiting: no nausea and no vomiting  Cardiovascular status: hemodynamically stable  Respiratory status: acceptable  Hydration status: euvolemic  Pain management: adequate    No notable events documented.

## 2025-07-02 NOTE — DISCHARGE INSTRUCTIONS
Discharge Instructions:  Please leave dressing clean, dry and intact until 1st office visit  Follow up at Dr. Gaytan' office on Tuesday, 7/8/2025 for 1st post-op visit.  Take all medications as prescribed  Can use heel for transferring if needed, otherwise NWB to RLE at this time.

## 2025-07-02 NOTE — ANESTHESIA PRE PROCEDURE
summary reviewed and Nursing notes reviewed   no history of anesthetic complications:   Airway: Mallampati: III  TM distance: >3 FB   Neck ROM: full  Mouth opening: > = 3 FB   Dental: normal exam         Pulmonary:Negative Pulmonary ROS breath sounds clear to auscultation                             Cardiovascular:    (+) hypertension:        Rhythm: regular  Rate: normal           Beta Blocker:  Dose within 24 Hrs         Neuro/Psych:   (+) psychiatric history:depression/anxiety dementia             ROS comment: Restless leg GI/Hepatic/Renal:   (+) GERD:, renal disease: CRI          Endo/Other:    (+) malignancy/cancer (skin).                  ROS comment: Perry'S  Basal cell CA 3rd toe Abdominal:             Vascular:           ROS comment: Raynaud's. Other Findings:             Anesthesia Plan      MAC     ASA 3       Induction: intravenous.      Anesthetic plan and risks discussed with patient and child/children.      Plan discussed with attending.                    Vianey Hunter, APRN - CRNA   7/2/2025      Patient seen and examined, chart reviewed, agree with above findings.  Anesthetic plan, risks, benefits, alternatives, and personnel involved discussed with patient.  Patient verbalized an understanding and agreed to proceed.  NPO status confirmed.    Anesthetic plan discussed with care team members and agreed upon.    Luis Haq DO   7/2/2025  11:57 AM

## 2025-07-02 NOTE — OP NOTE
[facility]              [address] [place] [address]                            OPERATIVE REPORT      PATIENT NAME: JENNIFER LARKIN              : 1940  MED REC NO: [id number]                        ROOM: [facility]  ACCOUNT NO: [id number]                       ADMIT DATE: 2025  PROVIDER: Davy Gaytan MD      DATE OF PROCEDURE:  2025    SURGEON:  Davy Gaytan MD    PREOPERATIVE DIAGNOSIS:  Basal cell carcinoma, right third toe.    POSTOPERATIVE DIAGNOSIS:  Basal cell carcinoma, right third toe.    PROCEDURES:    1. Excision of right third toe basal cell carcinoma (26 x 16 mm).  2. Amputation of the right second toe with skin preservation.  3. Local flap reconstruction to the third toe.    ANESTHESIA:  Local with IV sedation.    ASSISTANT:  Yvan Murphy, Podiatry Resident.    ESTIMATED BLOOD LOSS:  Minimal.    COMPLICATIONS:  None.    INDICATION FOR PROCEDURE:  The patient is an elderly 84-year-old female who presented as a referral from Dr. Ojeda.  He had been treating her for wound of her right medial toe.  After some time, he biopsied the area and was found to be a basal cell carcinoma, obviously it was nonhealing.  Recommendation was made for excision with local flap or skin graft to reconstruct the defect.  Indications, risks, alternatives, and benefits were outlined to the patient and her daughter preoperatively.  Risks including, but not limited to, bleeding, scarring, infection, hematoma, using the second toe as a flap to reconstruct the defect following amputation and anesthetic risks were all explained at length to the patient and her daughter.  Their questions were answered.  Informed consent was obtained.    DETAILS OF PROCEDURE:  The patient was marked in the holding area and brought to the operative suite, placed in supine position.  Compression hose and warming blanket and the time-out protocol were all recognized by the

## 2025-07-08 LAB — SURGICAL PATHOLOGY REPORT: NORMAL

## (undated) DEVICE — BANDAGE COMPR W4INXL10YD WHITE/BEIGE E MTRX HK LOOP CLSR

## (undated) DEVICE — TOWEL,OR,DSP,ST,BLUE,STD,6/PK,12PK/CS: Brand: MEDLINE

## (undated) DEVICE — BLADE,STAINLESS-STEEL,15,STRL,DISPOSABLE: Brand: MEDLINE

## (undated) DEVICE — STERILE POLYISOPRENE POWDER-FREE SURGICAL GLOVES: Brand: PROTEXIS

## (undated) DEVICE — Device

## (undated) DEVICE — BLADE, TONGUE, 6", STERILE: Brand: MEDLINE

## (undated) DEVICE — ELECTRODE PT RET AD L9FT HI MOIST COND ADH HYDRGEL CORDED

## (undated) DEVICE — SYRINGE MED 10ML TRNSLUC BRL PLUNG BLK MRK POLYPR CTRL

## (undated) DEVICE — PADDING,UNDERCAST,COTTON, 4"X4YD STERILE: Brand: MEDLINE

## (undated) DEVICE — UNIVERSAL DRAPE: Brand: MEDLINE INDUSTRIES, INC.

## (undated) DEVICE — NEEDLE HYPO 27GA L15IN REG BVL W O SFTY FOR SYR DISPOSABLE

## (undated) DEVICE — TUBING SUCT 12FR MAL ALUM SHFT FN CAP VENT UNIV CONN W/ OBT

## (undated) DEVICE — 1.5 TO 1 DERMACARRIER: Brand: MESHGRAFTTM  II TISSUE EXPANSION SYSTEM

## (undated) DEVICE — LIQUIBAND RAPID ADHESIVE 36/CS 0.8ML: Brand: MEDLINE

## (undated) DEVICE — WIPES SKIN CLOTH READYPREP 9 X 10.5 IN 2% CHLORHEX GLUCONATE CHG PREOP

## (undated) DEVICE — DERMATOME BLADES: Brand: DERMATOME